# Patient Record
Sex: FEMALE | Race: WHITE | Employment: FULL TIME | URBAN - METROPOLITAN AREA
[De-identification: names, ages, dates, MRNs, and addresses within clinical notes are randomized per-mention and may not be internally consistent; named-entity substitution may affect disease eponyms.]

---

## 2022-03-15 ENCOUNTER — TRANSCRIBE ORDER (OUTPATIENT)
Dept: SCHEDULING | Age: 47
End: 2022-03-15

## 2022-03-15 DIAGNOSIS — R09.89 CHOKING SENSATION: Primary | ICD-10-CM

## 2022-03-25 ENCOUNTER — HOSPITAL ENCOUNTER (OUTPATIENT)
Dept: GENERAL RADIOLOGY | Age: 47
Discharge: HOME | End: 2022-03-25
Attending: INTERNAL MEDICINE
Payer: COMMERCIAL

## 2022-03-25 DIAGNOSIS — R09.89 CHOKING SENSATION: ICD-10-CM

## 2022-03-25 PROCEDURE — 74011000250 HC RX REV CODE- 250: Performed by: INTERNAL MEDICINE

## 2022-03-25 PROCEDURE — 74221 X-RAY XM ESOPHAGUS 2CNTRST: CPT

## 2022-03-25 RX ADMIN — BARIUM SULFATE 355 ML: 0.6 SUSPENSION ORAL at 08:35

## 2022-03-25 RX ADMIN — ANTACID/ANTIFLATULENT 4 G: 380; 550; 10; 10 GRANULE, EFFERVESCENT ORAL at 08:35

## 2022-03-25 RX ADMIN — BARIUM SULFATE 135 ML: 980 POWDER, FOR SUSPENSION ORAL at 08:35

## 2022-03-25 RX ADMIN — BARIUM SULFATE 700 MG: 700 TABLET ORAL at 08:35

## 2022-04-29 ENCOUNTER — HOSPITAL ENCOUNTER (OUTPATIENT)
Dept: LAB | Age: 47
Discharge: HOME | End: 2022-04-29

## 2022-04-29 PROCEDURE — 88305 TISSUE EXAM BY PATHOLOGIST: CPT

## 2022-05-10 ENCOUNTER — HOSPITAL ENCOUNTER (OUTPATIENT)
Dept: PHYSICAL THERAPY | Age: 47
Discharge: HOME | End: 2022-05-10
Payer: COMMERCIAL

## 2022-05-10 PROCEDURE — 92610 EVALUATE SWALLOWING FUNCTION: CPT

## 2022-05-10 NOTE — THERAPY EVALUATION
Meri Byrd  : 1975  Primary: [facility]*  Secondary:  [address] at [facility]  [address] [facility], [address] [place] [address],[address], [facility] [address] [facility].  [phone]   [phone]        OUTPATIENT SPEECH THERAPY:Initial Assessment   ICD-10: Treatment Diagnosis: R13.19 Other Dysphagia  Precautions/Allergies:   Patient has no known allergies. TREATMENT PLAN:  Effective Dates: 5/10/2022 TO 2022 (90 days). Frequency/Duration: 1 time a week for 90 Day(s) - Patient requested scheduling follow up as needed MEDICAL/REFERRING DIAGNOSIS:  Other specified symptoms and signs involving the circulatory and respiratory systems [R09.89]   DATE OF ONSET: Chronic  REFERRING PHYSICIAN: Morelia Ash MD MD Orders: Evaluate and treat       INITIAL ASSESSMENT (DATE 5/10/2022):  Ms. Ale Wilkins presents with chief complaint of chronic choking sensation/coughing with and without po intake and states this has been occurring for years. Patient described occurrence as something feels as if it dislodges in her throat, causing coughing to the point of feeling difficult to catch her breath, dry heaving, and watery eyes. Patient endorses this can happen without po intake, just with saliva at times. Reports frequency of occurrence to be once a week. Patient was seen by GI with findings of sliding hiatal hernia, and no further recommendations. Patient endorses rare reflux symptoms. Patient denies allergies or additional environmental irritants. Patient completed trials with thin liquid by cup with single sip and serial sips. Patient demonstrates a tongue thrust swallow pattern at baseline. Resting mouth posture was reported to be tongue tip down. Swallow pattern may contribute to decreased bolus control, reduced tongue base retraction with swallow, but unable to determine without objective assessment (Modified Barium Swallow Study).  Provided patient with education of typical resting mouth posture (tongue \"suctioned\" to roof of mouth) and typical swallow pattern (tongue up and back) in comparison to patient's current patterns of tongue tip down at rest and tongue pattern moving out and then down with swallow, likely resulting in reduced tongue base retraction. Discussed modifications and compensatory strategies to aid in reducing choking sensation occurrence. Patient provided with instruction and completed exercises of Effortful swallow and Tawny during today's assessment. Patient utilized strategies with sips of liquid. No overt signs or symptoms of airway compromise during today's assessment; however, suspect unlikely to occur during assessment given current frequency of occurrence. Patient requested initiating home exercise program before scheduling further follow up and deferred completing Modified Barium Swallow Study for further objective assessment at this time. PROBLEM LIST (Impairments causing functional limitations):  1. Dysphagia with cough INTERVENTIONS PLANNED: (Treatment may consist of any combination of the following)  1. Training in compensatory strategies  2. Modified Barium Swallow Study following therapy as indicated   RECOMMENDATIONS/PLAN:   DIET:    Regular Consistency   Thin Liquids  MEDICATIONS: With liquid   COMPENSATORY STRATEGIES/MODIFICATIONS INCLUDING:  · Upright for all PO  · Effortful swallow  · Small bites and sips  · Clear throat periodically and dry swallow  · Remain upright for 20-30 min after any PO  · Slow rate of PO intake   OTHER RECOMMENDATIONS (including follow up treatment recommendations):   · Treatment to improve/facilitate oral/pharyngeal skills   · Modified Barium Swallow Study as clinically indicated     GOALS: (Goals have been discussed and agreed upon with patient.)  Short-Term Functional Goals: Time Frame: 8 weeks  1. Patient will perform exercises and compensatory strategies to improve swallow safety and coordination of swallow function.   2. Patient will perform home exercise program/compensatory strategies to reduce instances of chronic coughing with 90% efficiency. Discharge Goals: Time Frame: 12 weeks  1. Patient will improve swallowing to SIRI NOMS level 7 to meet hydration needs via PO modality without adverse pulmonary events. MEDICAL NECESSITY:   · Patient is expected to demonstrate progress in swallow function and swallow safety to improve swallow safety. REASON FOR SERVICES/OTHER COMMENTS:  · Patient continues to require skilled intervention due to chronic cough. Rehabilitation Potential For Stated Goals: Good       SUBJECTIVE:   Patient was a pleasure to work with today. Pain associated with swallow: 0/10   HISTORY:   History of Injury/Illness (Reason for Referral):  Patient referred due to complaint of choking sensation that has occurred for several years. Patient was seen by GI and had Barium Swallow Esophagram 3/25 with mostly unrevealing findings, with hiatal hernia and no further recommendations. Past Medical History/Comorbidities:   Ms. Manuel Zacarias  has no past medical history on file. Ms. Manuel Zacarias  has no past surgical history on file. Social/Work History and Prior Level of Function:   Employed  Current Dietary Status:  Regular/thin  Previous Instrumental Swallow Studies:  Barium Swallow Esophagram completed with GI 3/25/22. No further studies. Current Medications:     No current outpatient medications on file. Date Last Reviewed:  5/10/202   OBJECTIVE:   OUTCOME MEASURE:   Tool Used: National Outcomes Measurement System: Functional Communication Measures: SWALLOWING  Score: Level 6:  Swallowing is safe, and the individual eats and drinks independently and may rarely require minimal cueing. The individual usually self-cues when difficulty occurs. May need to avoid specific food items (e.g., popcorn and nuts), or require additional time (due to dysphagia). Initial: 6 (Date: 5/10/2022)   Interpretation of Score:    This measure describes the functional swallowing status subsequent to speech-language pathology treatment of patients with dysphagia. Orientation:  Person  Place  Time  Situation    Oral Assessment:  Labial: No impairment  Lingual: No impairment and patient endorses tongue thrust pattern with swallow  Dentition: Natural  Oral Hygiene: Adequate  Vocal Quality: WFL  Speech Inteligiblity: WFL    Patient reports chronic choking sensation/coughing with and without po intake and states this has been occurring for years. Patient described occurrence as something feels as if it dislodges in her throat, causing coughing to the point of feeling difficult to catch her breath, dry heaving, and watery eyes. Patient endorses this can happen without po intake, just with saliva at times and inquired if there was premature spillage of saliva or what she is eating/drinking. Reports frequency of occurrence to be once a week. Patient states she does not notice a pattern with any particular food texture and does not feel she has to avoid certain foods. Patient was seen by GI with findings of sliding hiatal hernia, and no further recommendations. Patient endorses rare reflux symptoms. Patient denies allergies or additional environmental irritants. Patient completed trials with thin liquid by cup with single sip and serial sips. Patient demonstrates a tongue thrust swallow pattern at baseline. Resting mouth posture was reported to be tongue tip down. Swallow pattern may contribute to decreased bolus control, reduced tongue base retraction with swallow, but unable to determine without objective assessment (Modified Barium Swallow Study).  Provided patient with education of typical resting mouth posture (tongue \"suctioned\" to roof of mouth) and typical swallow pattern (tongue up and back) in comparison to patient's current patterns of tongue tip down at rest and tongue pattern moving out and then down with swallow, likely resulting in reduced tongue base retraction. Discussed modifications and compensatory strategies to aid in reducing choking sensation occurrence. Patient provided with instruction and completed exercises of periodically clear throat and dry swallow, Effortful swallow, Tawny during today's assessment. Patient utilized strategies with sips of liquid. No overt signs or symptoms of airway compromise during today's assessment; however, suspect unlikely to occur during assessment given current frequency of occurrence. TODAY'S TREATMENT:   In addition to Assessment/Re-Assessment sessions the following treatments were rendered)  Assessment only; No treatment(s) provided today   Regarding Venecia Gomez's therapy, I certify that the treatment plan above will be carried out by a therapist or under their direction. Thank you for this referral,  RAKESH Lemus     Total Duration:  Time In: 1525  Time Out: 1600  Referring Physician Signature: Asif Mazariegos MD     No future appointments.

## 2022-08-31 ENCOUNTER — HOSPITAL ENCOUNTER (OUTPATIENT)
Dept: LAB | Age: 47
Discharge: HOME | End: 2022-09-03

## 2022-08-31 PROCEDURE — 88305 TISSUE EXAM BY PATHOLOGIST: CPT

## 2024-07-08 ENCOUNTER — INITIAL CONSULT (OUTPATIENT)
Age: 49
End: 2024-07-08
Payer: COMMERCIAL

## 2024-07-08 VITALS
HEIGHT: 67 IN | DIASTOLIC BLOOD PRESSURE: 80 MMHG | SYSTOLIC BLOOD PRESSURE: 118 MMHG | HEART RATE: 67 BPM | WEIGHT: 167 LBS | BODY MASS INDEX: 26.21 KG/M2

## 2024-07-08 DIAGNOSIS — Z76.89 ENCOUNTER TO ESTABLISH CARE: Primary | ICD-10-CM

## 2024-07-08 DIAGNOSIS — R94.31 ABNORMAL EKG: ICD-10-CM

## 2024-07-08 DIAGNOSIS — Z82.49 FAMILY HISTORY OF CARDIOVASCULAR DISEASE: ICD-10-CM

## 2024-07-08 PROCEDURE — 3017F COLORECTAL CA SCREEN DOC REV: CPT | Performed by: INTERNAL MEDICINE

## 2024-07-08 PROCEDURE — 99203 OFFICE O/P NEW LOW 30 MIN: CPT | Performed by: INTERNAL MEDICINE

## 2024-07-08 PROCEDURE — 1036F TOBACCO NON-USER: CPT | Performed by: INTERNAL MEDICINE

## 2024-07-08 PROCEDURE — G8427 DOCREV CUR MEDS BY ELIG CLIN: HCPCS | Performed by: INTERNAL MEDICINE

## 2024-07-08 PROCEDURE — G8419 CALC BMI OUT NRM PARAM NOF/U: HCPCS | Performed by: INTERNAL MEDICINE

## 2024-07-08 PROCEDURE — 93000 ELECTROCARDIOGRAM COMPLETE: CPT | Performed by: INTERNAL MEDICINE

## 2024-07-08 RX ORDER — LEVONORGESTREL AND ETHINYL ESTRADIOL 6-5-10
KIT ORAL
COMMUNITY
Start: 1999-01-01

## 2024-07-08 ASSESSMENT — ENCOUNTER SYMPTOMS
COUGH: 0
ORTHOPNEA: 0
SPUTUM PRODUCTION: 0
CHANGE IN BOWEL HABIT: 0
ABDOMINAL PAIN: 0
COLOR CHANGE: 0
NAUSEA: 0
HEMATOCHEZIA: 0
DIARRHEA: 0
SWOLLEN GLANDS: 0
BOWEL INCONTINENCE: 0
BLURRED VISION: 0
HOARSE VOICE: 0
SORE THROAT: 0
VOMITING: 0
HEMATEMESIS: 0
VISUAL CHANGE: 0
SHORTNESS OF BREATH: 0
WHEEZING: 0

## 2024-07-08 NOTE — PROGRESS NOTES
[facility] CARDIOLOGY  [address] [place] [address], [place] [address]  PHONE: [phone]        24    NAME:  Gina Cheng  : 1975  MRN: [id number]       SUBJECTIVE:   Gina Cheng is a 49 y.o. female seen for a consultation visit regarding the following: The patient is referred by CHRISTINE Chauhdary for evaluation of an abnormal EKG.Recent recent at her PCP's office reported sinus bradycardia with a first degree AV block.    Chief Complaint   Patient presents with    Consultation            HPI:  Consultation is requested by [unfilled] for evaluation of Consultation   .    Abnormal Lab  Pertinent negatives include no abdominal pain, anorexia, arthralgias, change in bowel habit, chest pain, chills, congestion, coughing, diaphoresis, fatigue, fever, headaches, joint swelling, myalgias, nausea, neck pain, numbness, rash, sore throat, swollen glands, urinary symptoms, vertigo, visual change, vomiting or weakness.           Past Medical History, Past Surgical History, Family history, Social History, and Medications were all reviewed with the patient today and updated as necessary.       No Known Allergies    Current Outpatient Medications:     ENPRESSE-28 50-30/75-40/ 125-30 MCG TABS, , Disp: , Rfl:     Multiple Vitamins-Minerals (AIRBORNE PO), Take by mouth, Disp: , Rfl:     Multiple Vitamins-Minerals (HAIR SKIN & NAILS PO), Take by mouth, Disp: , Rfl:   History reviewed. No pertinent past medical history.  History reviewed. No pertinent surgical history.  Family History   Problem Relation Age of Onset    Heart Attack Maternal Aunt     Heart Attack Maternal Uncle      Social History     Tobacco Use    Smoking status: Never    Smokeless tobacco: Never   Substance Use Topics    Alcohol use: Yes     Alcohol/week: 7.0 standard drinks of alcohol     Types: 7 Glasses of wine per week       ROS:    Review of Systems   Constitutional: Negative for chills, decreased appetite, diaphoresis, fatigue, fever

## 2024-07-26 ENCOUNTER — HOSPITAL ENCOUNTER (OUTPATIENT)
Dept: CT IMAGING | Age: 49
Discharge: HOME | End: 2024-07-26
Attending: INTERNAL MEDICINE

## 2024-07-26 DIAGNOSIS — Z82.49 FAMILY HISTORY OF CARDIOVASCULAR DISEASE: ICD-10-CM

## 2024-07-26 PROCEDURE — 75571 CT HRT W/O DYE W/CA TEST: CPT

## 2024-11-26 ENCOUNTER — APPOINTMENT (RX ONLY)
Dept: URBAN - METROPOLITAN AREA CLINIC 330 | Facility: CLINIC | Age: 49
Setting detail: DERMATOLOGY
End: 2024-11-26

## 2024-11-26 DIAGNOSIS — D22 MELANOCYTIC NEVI: ICD-10-CM | Status: STABLE

## 2024-11-26 DIAGNOSIS — L81.4 OTHER MELANIN HYPERPIGMENTATION: ICD-10-CM | Status: STABLE

## 2024-11-26 DIAGNOSIS — D18.0 HEMANGIOMA: ICD-10-CM | Status: STABLE

## 2024-11-26 PROBLEM — D22.5 MELANOCYTIC NEVI OF TRUNK: Status: ACTIVE | Noted: 2024-11-26

## 2024-11-26 PROBLEM — D18.01 HEMANGIOMA OF SKIN AND SUBCUTANEOUS TISSUE: Status: ACTIVE | Noted: 2024-11-26

## 2024-11-26 PROCEDURE — ? COUNSELING

## 2024-11-26 PROCEDURE — ? TREATMENT REGIMEN

## 2024-11-26 PROCEDURE — 99203 OFFICE O/P NEW LOW 30 MIN: CPT

## 2024-11-26 PROCEDURE — ? PHOTO-DOCUMENTATION

## 2024-11-26 ASSESSMENT — LOCATION DETAILED DESCRIPTION DERM
LOCATION DETAILED: LEFT DISTAL DORSAL FOREARM
LOCATION DETAILED: LEFT PROXIMAL DORSAL FOREARM
LOCATION DETAILED: RIGHT DISTAL DORSAL FOREARM
LOCATION DETAILED: RIGHT PROXIMAL DORSAL FOREARM
LOCATION DETAILED: LEFT INFERIOR CENTRAL MALAR CHEEK
LOCATION DETAILED: RIGHT MEDIAL MALAR CHEEK
LOCATION DETAILED: INFERIOR THORACIC SPINE

## 2024-11-26 ASSESSMENT — LOCATION ZONE DERM
LOCATION ZONE: FACE
LOCATION ZONE: ARM
LOCATION ZONE: TRUNK

## 2024-11-26 ASSESSMENT — LOCATION SIMPLE DESCRIPTION DERM
LOCATION SIMPLE: RIGHT CHEEK
LOCATION SIMPLE: RIGHT FOREARM
LOCATION SIMPLE: UPPER BACK
LOCATION SIMPLE: LEFT FOREARM
LOCATION SIMPLE: LEFT CHEEK